# Patient Record
Sex: FEMALE | Race: WHITE | NOT HISPANIC OR LATINO | URBAN - METROPOLITAN AREA
[De-identification: names, ages, dates, MRNs, and addresses within clinical notes are randomized per-mention and may not be internally consistent; named-entity substitution may affect disease eponyms.]

---

## 2018-11-13 ENCOUNTER — IMPORTED ENCOUNTER (OUTPATIENT)
Dept: URBAN - METROPOLITAN AREA CLINIC 59 | Facility: CLINIC | Age: 58
End: 2018-11-13

## 2018-11-13 PROBLEM — H35.372 PUCKERING OF MACULA, LEFT EYE: Noted: 2018-11-13

## 2018-11-13 PROBLEM — H35.363 DRUSEN (DEGENERATIVE) OF MACULA, BILATERAL: Noted: 2018-11-13

## 2018-11-13 PROBLEM — H25.13 BILATERAL NUCLEAR SCLEROSIS CATARACTS: Noted: 2018-11-13

## 2018-11-13 PROCEDURE — 92134 CPTRZ OPH DX IMG PST SGM RTA: CPT

## 2018-11-13 PROCEDURE — 99204 OFFICE O/P NEW MOD 45 MIN: CPT

## 2019-11-12 ENCOUNTER — IMPORTED ENCOUNTER (OUTPATIENT)
Dept: URBAN - METROPOLITAN AREA CLINIC 59 | Facility: CLINIC | Age: 59
End: 2019-11-12

## 2019-11-12 PROBLEM — Z83.518 FAMILY HX OF OTHER SPECIFIED EYE DISORDER: Noted: 2019-11-12

## 2019-11-12 PROBLEM — H35.363 DRUSEN (DEGENERATIVE) OF MACULA, BILATERAL: Noted: 2019-11-12

## 2019-11-12 PROBLEM — H40.013 OPEN ANGLE WITH BORDERLINE FINDINGS, LOW RISK, BILATERAL: Noted: 2019-11-12

## 2019-11-12 PROBLEM — H35.372 PUCKERING OF MACULA, LEFT EYE: Noted: 2019-11-12

## 2019-11-12 PROBLEM — H25.13 BILATERAL NUCLEAR SCLEROSIS CATARACTS: Noted: 2019-11-12

## 2019-11-12 PROCEDURE — 92134 CPTRZ OPH DX IMG PST SGM RTA: CPT

## 2019-11-12 PROCEDURE — 99214 OFFICE O/P EST MOD 30 MIN: CPT

## 2020-01-20 ENCOUNTER — APPOINTMENT (RX ONLY)
Dept: URBAN - METROPOLITAN AREA CLINIC 348 | Facility: CLINIC | Age: 60
Setting detail: DERMATOLOGY
End: 2020-01-20

## 2020-01-20 DIAGNOSIS — L85.3 XEROSIS CUTIS: ICD-10-CM

## 2020-01-20 DIAGNOSIS — B35.3 TINEA PEDIS: ICD-10-CM

## 2020-01-20 PROCEDURE — 99203 OFFICE O/P NEW LOW 30 MIN: CPT

## 2020-01-20 PROCEDURE — ? COUNSELING

## 2020-01-20 PROCEDURE — ? TREATMENT REGIMEN

## 2020-01-20 PROCEDURE — ? PRESCRIPTION

## 2020-01-20 RX ORDER — KETOCONAZOLE 20 MG/G
CREAM TOPICAL
Qty: 1 | Refills: 2 | Status: ERX | COMMUNITY
Start: 2020-01-20

## 2020-01-20 RX ORDER — TRIAMCINOLONE ACETONIDE 1 MG/G
CREAM TOPICAL BID
Qty: 1 | Refills: 1 | Status: ERX | COMMUNITY
Start: 2020-01-20

## 2020-01-20 RX ADMIN — KETOCONAZOLE: 20 CREAM TOPICAL at 00:00

## 2020-01-20 RX ADMIN — TRIAMCINOLONE ACETONIDE: 1 CREAM TOPICAL at 00:00

## 2020-01-20 ASSESSMENT — LOCATION ZONE DERM
LOCATION ZONE: TRUNK
LOCATION ZONE: FEET

## 2020-01-20 ASSESSMENT — LOCATION SIMPLE DESCRIPTION DERM
LOCATION SIMPLE: LEFT PLANTAR SURFACE
LOCATION SIMPLE: LEFT UPPER BACK
LOCATION SIMPLE: RIGHT PLANTAR SURFACE

## 2020-01-20 ASSESSMENT — LOCATION DETAILED DESCRIPTION DERM
LOCATION DETAILED: RIGHT MEDIAL PLANTAR MIDFOOT
LOCATION DETAILED: LEFT SUPERIOR MEDIAL UPPER BACK
LOCATION DETAILED: LEFT MEDIAL PLANTAR MIDFOOT

## 2020-01-20 NOTE — PROCEDURE: MIPS QUALITY
Quality 226: Preventive Care And Screening: Tobacco Use: Screening And Cessation Intervention: Patient screened for tobacco use and is an ex/non-smoker
Detail Level: Detailed
Quality 111:Pneumonia Vaccination Status For Older Adults: Pneumococcal Vaccination Previously Received
Quality 130: Documentation Of Current Medications In The Medical Record: Current Medications Documented
Quality 110: Preventive Care And Screening: Influenza Immunization: Influenza Immunization Administered during Influenza season

## 2020-01-20 NOTE — PROCEDURE: TREATMENT REGIMEN
Initiate Treatment: Ketoconazole cream twice daily x6 weeks
Modify Regimen: Dove unscented soap in shower \\nFragrance free laundry detergent
Plan: Sensitive skin regimen reviewed
Detail Level: Zone
Continue Regimen: Medrol dose gonsalo as directed- from pcp
Otc Regimen: Moisturize daily
Initiate Treatment: Triamcinolone twice daily x2 weeks then as needed for flare

## 2020-10-27 ENCOUNTER — IMPORTED ENCOUNTER (OUTPATIENT)
Dept: URBAN - METROPOLITAN AREA CLINIC 59 | Facility: CLINIC | Age: 60
End: 2020-10-27

## 2020-10-27 PROBLEM — H25.13 BILATERAL NUCLEAR SCLEROSIS CATARACTS: Noted: 2020-10-27

## 2020-10-27 PROBLEM — H35.363 DRUSEN (DEGENERATIVE) OF MACULA, BILATERAL: Noted: 2020-10-27

## 2020-10-27 PROBLEM — Z83.518 FAMILY HX OF OTHER SPECIFIED EYE DISORDER: Noted: 2020-10-27

## 2020-10-27 PROBLEM — H02.34 BLEPHAROCHALASIS OF LT UPPER EYELID: Noted: 2020-10-27

## 2020-10-27 PROBLEM — H35.372 PUCKERING OF MACULA, LEFT EYE: Noted: 2020-10-27

## 2020-10-27 PROBLEM — H40.013 OPEN ANGLE WITH BORDERLINE FINDINGS, LOW RISK, BILATERAL: Noted: 2020-10-27

## 2020-10-27 PROBLEM — H02.31 PSEUDOPTOSIS OF RIGHT UPPER EYELID: Noted: 2020-10-27

## 2020-10-27 PROCEDURE — 76514 ECHO EXAM OF EYE THICKNESS: CPT

## 2020-10-27 PROCEDURE — 92133 CPTRZD OPH DX IMG PST SGM ON: CPT

## 2020-10-27 PROCEDURE — 99214 OFFICE O/P EST MOD 30 MIN: CPT

## 2021-10-26 ENCOUNTER — IMPORTED ENCOUNTER (OUTPATIENT)
Dept: URBAN - METROPOLITAN AREA CLINIC 59 | Facility: CLINIC | Age: 61
End: 2021-10-26

## 2021-10-26 PROBLEM — H35.372 PUCKERING OF MACULA, LEFT EYE: Noted: 2021-10-26

## 2021-10-26 PROBLEM — H02.36 BLEPHAROCHALASIS OF LT UPPER EYELID: Noted: 2021-10-26

## 2021-10-26 PROBLEM — H40.013 OPEN ANGLE WITH BORDERLINE GLAUCOMA FINDINGS, LOW RISK: Noted: 2021-10-26

## 2021-10-26 PROBLEM — H02.31 PSEUDOPTOSIS OF RIGHT UPPER EYELID: Noted: 2021-10-26

## 2021-10-26 PROBLEM — H25.13 BILATERAL NUCLEAR SCLEROSIS CATARACTS: Noted: 2021-10-26

## 2021-10-26 PROBLEM — H40.013 OPEN ANGLE WITH BORDERLINE FINDINGS, LOW RISK, BILATERAL: Noted: 2021-10-26

## 2021-10-26 PROBLEM — Z83.518 FAMILY HX OF OTHER SPECIFIED EYE DISORDER: Noted: 2021-10-26

## 2021-10-26 PROBLEM — H25.13 NUCLEAR SCLEROSIS: Noted: 2021-10-26

## 2021-10-26 PROBLEM — H02.33 BLEPHAROCHALASIS: Noted: 2021-10-26

## 2021-10-26 PROBLEM — H35.372 MACULAR PUCKER: Noted: 2021-10-26

## 2021-10-26 PROBLEM — H02.34 BLEPHAROCHALASIS OF LT UPPER EYELID: Noted: 2021-10-26

## 2021-10-26 PROBLEM — H02.33 PSEUDOPTOSIS OF RIGHT UPPER EYELID: Noted: 2021-10-26

## 2021-10-26 PROBLEM — H02.36 BLEPHAROCHALASIS: Noted: 2021-10-26

## 2021-10-26 PROCEDURE — 99214 OFFICE O/P EST MOD 30 MIN: CPT

## 2021-10-26 PROCEDURE — 92133 CPTRZD OPH DX IMG PST SGM ON: CPT

## 2022-11-04 ENCOUNTER — EMERGENCY VISIT (OUTPATIENT)
Dept: URBAN - METROPOLITAN AREA CLINIC 22 | Facility: CLINIC | Age: 62
End: 2022-11-04

## 2022-11-04 DIAGNOSIS — H02.36: ICD-10-CM

## 2022-11-04 DIAGNOSIS — H02.33: ICD-10-CM

## 2022-11-04 DIAGNOSIS — H40.013: ICD-10-CM

## 2022-11-04 DIAGNOSIS — H11.32: ICD-10-CM

## 2022-11-04 PROCEDURE — 92012 INTRM OPH EXAM EST PATIENT: CPT

## 2022-11-04 ASSESSMENT — TONOMETRY
OS_IOP_MMHG: 15
OD_IOP_MMHG: 14

## 2022-11-04 ASSESSMENT — VISUAL ACUITY
OD_CC: 20/20
OS_PH: 20/20
OS_CC: 20/30-2

## 2022-12-02 ENCOUNTER — ESTABLISHED COMPREHENSIVE EXAM (OUTPATIENT)
Dept: URBAN - METROPOLITAN AREA CLINIC 22 | Facility: CLINIC | Age: 62
End: 2022-12-02

## 2022-12-02 DIAGNOSIS — H25.13: ICD-10-CM

## 2022-12-02 DIAGNOSIS — H40.013: ICD-10-CM

## 2022-12-02 DIAGNOSIS — Z83.518: ICD-10-CM

## 2022-12-02 DIAGNOSIS — H02.33: ICD-10-CM

## 2022-12-02 DIAGNOSIS — H02.36: ICD-10-CM

## 2022-12-02 DIAGNOSIS — H35.372: ICD-10-CM

## 2022-12-02 PROCEDURE — 92014 COMPRE OPH EXAM EST PT 1/>: CPT

## 2022-12-02 PROCEDURE — 92133 CPTRZD OPH DX IMG PST SGM ON: CPT

## 2022-12-02 ASSESSMENT — TONOMETRY
OS_IOP_MMHG: 14
OD_IOP_MMHG: 14

## 2022-12-02 ASSESSMENT — VISUAL ACUITY
OD_CC: 20/20
OS_CC: 20/25-1

## 2023-10-14 ASSESSMENT — TONOMETRY
OS_IOP_MMHG: 19
OS_IOP_MMHG: 18
OS_IOP_MMHG: 16
OD_IOP_MMHG: 17
OD_IOP_MMHG: 18
OD_IOP_MMHG: 19

## 2023-10-14 ASSESSMENT — VISUAL ACUITY
OD_SC: 20/40-1
OS_SC: 20/40
OS_CC: 20/20
OD_CC: 20/20
OS_CC: 20/25+2
OS_CC: 20/25+1
OD_CC: 20/20
OD_CC: 20/20
OD_CC: 20/30+2
OS_CC: 20/25+1

## 2023-11-21 ENCOUNTER — ESTABLISHED COMPREHENSIVE EXAM (OUTPATIENT)
Dept: URBAN - METROPOLITAN AREA CLINIC 22 | Facility: CLINIC | Age: 63
End: 2023-11-21

## 2023-11-21 DIAGNOSIS — H02.36: ICD-10-CM

## 2023-11-21 DIAGNOSIS — H02.33: ICD-10-CM

## 2023-11-21 DIAGNOSIS — H40.013: ICD-10-CM

## 2023-11-21 DIAGNOSIS — H25.13: ICD-10-CM

## 2023-11-21 DIAGNOSIS — H35.372: ICD-10-CM

## 2023-11-21 PROCEDURE — 92014 COMPRE OPH EXAM EST PT 1/>: CPT

## 2023-11-21 PROCEDURE — 92133 CPTRZD OPH DX IMG PST SGM ON: CPT

## 2023-11-21 ASSESSMENT — VISUAL ACUITY
OD_CC: 20/20
OS_CC: 20/20

## 2023-11-21 ASSESSMENT — TONOMETRY
OD_IOP_MMHG: 17
OS_IOP_MMHG: 18

## 2024-06-25 ENCOUNTER — ESTABLISHED COMPREHENSIVE EXAM (OUTPATIENT)
Dept: URBAN - METROPOLITAN AREA CLINIC 22 | Facility: CLINIC | Age: 64
End: 2024-06-25

## 2024-06-25 DIAGNOSIS — H40.013: ICD-10-CM

## 2024-06-25 DIAGNOSIS — H02.33: ICD-10-CM

## 2024-06-25 DIAGNOSIS — H35.372: ICD-10-CM

## 2024-06-25 DIAGNOSIS — H02.36: ICD-10-CM

## 2024-06-25 DIAGNOSIS — H25.13: ICD-10-CM

## 2024-06-25 PROCEDURE — 92133 CPTRZD OPH DX IMG PST SGM ON: CPT

## 2024-06-25 PROCEDURE — 92083 EXTENDED VISUAL FIELD XM: CPT

## 2024-06-25 PROCEDURE — 92014 COMPRE OPH EXAM EST PT 1/>: CPT

## 2024-06-25 ASSESSMENT — VISUAL ACUITY
OD_CC: 20/20-1
OS_CC: 20/20

## 2024-06-25 ASSESSMENT — TONOMETRY
OS_IOP_MMHG: 18
OD_IOP_MMHG: 18

## 2024-08-06 ENCOUNTER — APPOINTMENT (RX ONLY)
Dept: URBAN - METROPOLITAN AREA CLINIC 341 | Facility: CLINIC | Age: 64
Setting detail: DERMATOLOGY
End: 2024-08-06

## 2024-08-06 DIAGNOSIS — D18.0 HEMANGIOMA: ICD-10-CM | Status: STABLE

## 2024-08-06 DIAGNOSIS — L30.9 DERMATITIS, UNSPECIFIED: ICD-10-CM | Status: INADEQUATELY CONTROLLED

## 2024-08-06 PROBLEM — D18.01 HEMANGIOMA OF SKIN AND SUBCUTANEOUS TISSUE: Status: ACTIVE | Noted: 2024-08-06

## 2024-08-06 PROCEDURE — ? COUNSELING

## 2024-08-06 PROCEDURE — 99204 OFFICE O/P NEW MOD 45 MIN: CPT

## 2024-08-06 PROCEDURE — ? PRESCRIPTION

## 2024-08-06 PROCEDURE — ? PRESCRIPTION MEDICATION MANAGEMENT

## 2024-08-06 RX ORDER — CLOBETASOL PROPIONATE 0.5 MG/G
OINTMENT TOPICAL ONCE DAILY
Qty: 60 | Refills: 2 | Status: ERX | COMMUNITY
Start: 2024-08-06

## 2024-08-06 RX ADMIN — CLOBETASOL PROPIONATE: 0.5 OINTMENT TOPICAL at 00:00

## 2024-08-06 ASSESSMENT — LOCATION DETAILED DESCRIPTION DERM
LOCATION DETAILED: RIGHT MID DORSAL RING FINGER
LOCATION DETAILED: EPIGASTRIC SKIN
LOCATION DETAILED: RIGHT MID DORSAL INDEX FINGER
LOCATION DETAILED: RIGHT DISTAL ULNAR THUMB

## 2024-08-06 ASSESSMENT — LOCATION SIMPLE DESCRIPTION DERM
LOCATION SIMPLE: RIGHT THUMB
LOCATION SIMPLE: ABDOMEN
LOCATION SIMPLE: RIGHT INDEX FINGER
LOCATION SIMPLE: RIGHT RING FINGER

## 2024-08-06 ASSESSMENT — LOCATION ZONE DERM
LOCATION ZONE: TRUNK
LOCATION ZONE: FINGER

## 2024-08-06 NOTE — PROCEDURE: PRESCRIPTION MEDICATION MANAGEMENT
Detail Level: Zone
Render In Strict Bullet Format?: No
Initiate Treatment: clobetasol 0.05 % topical ointment: Apply to the affected areas on the fingers twice daily x 2 weeks then as needed for flares. Do NOT use on the face

## 2024-10-28 ENCOUNTER — APPOINTMENT (RX ONLY)
Dept: URBAN - METROPOLITAN AREA CLINIC 341 | Facility: CLINIC | Age: 64
Setting detail: DERMATOLOGY
End: 2024-10-28

## 2024-10-28 DIAGNOSIS — D18.0 HEMANGIOMA: ICD-10-CM

## 2024-10-28 DIAGNOSIS — L30.4 ERYTHEMA INTERTRIGO: ICD-10-CM | Status: INADEQUATELY CONTROLLED

## 2024-10-28 DIAGNOSIS — L82.1 OTHER SEBORRHEIC KERATOSIS: ICD-10-CM

## 2024-10-28 DIAGNOSIS — L30.9 DERMATITIS, UNSPECIFIED: ICD-10-CM | Status: INADEQUATELY CONTROLLED

## 2024-10-28 DIAGNOSIS — L85.2 KERATOSIS PUNCTATA (PALMARIS ET PLANTARIS): ICD-10-CM | Status: INADEQUATELY CONTROLLED

## 2024-10-28 DIAGNOSIS — D22 MELANOCYTIC NEVI: ICD-10-CM

## 2024-10-28 DIAGNOSIS — L81.4 OTHER MELANIN HYPERPIGMENTATION: ICD-10-CM

## 2024-10-28 PROBLEM — D22.62 MELANOCYTIC NEVI OF LEFT UPPER LIMB, INCLUDING SHOULDER: Status: ACTIVE | Noted: 2024-10-28

## 2024-10-28 PROBLEM — D18.01 HEMANGIOMA OF SKIN AND SUBCUTANEOUS TISSUE: Status: ACTIVE | Noted: 2024-10-28

## 2024-10-28 PROCEDURE — ? OTC TREATMENT REGIMEN

## 2024-10-28 PROCEDURE — ? TREATMENT REGIMEN

## 2024-10-28 PROCEDURE — ? OTC SALICYLIC ACID COUNSELING

## 2024-10-28 PROCEDURE — ? PRESCRIPTION MEDICATION MANAGEMENT

## 2024-10-28 PROCEDURE — ? COUNSELING

## 2024-10-28 PROCEDURE — ? FULL BODY SKIN EXAM

## 2024-10-28 PROCEDURE — 99214 OFFICE O/P EST MOD 30 MIN: CPT

## 2024-10-28 ASSESSMENT — LOCATION ZONE DERM
LOCATION ZONE: FINGER
LOCATION ZONE: HAND
LOCATION ZONE: TRUNK
LOCATION ZONE: FEET

## 2024-10-28 ASSESSMENT — LOCATION SIMPLE DESCRIPTION DERM
LOCATION SIMPLE: RIGHT THUMB
LOCATION SIMPLE: RIGHT INDEX FINGER
LOCATION SIMPLE: LEFT HAND
LOCATION SIMPLE: LEFT PLANTAR SURFACE
LOCATION SIMPLE: ABDOMEN
LOCATION SIMPLE: RIGHT RING FINGER

## 2024-10-28 ASSESSMENT — LOCATION DETAILED DESCRIPTION DERM
LOCATION DETAILED: 3RD WEB SPACE LEFT HAND
LOCATION DETAILED: LEFT RADIAL DORSAL HAND
LOCATION DETAILED: LEFT PLANTAR FOREFOOT OVERLYING 2ND METATARSAL
LOCATION DETAILED: RIGHT MID DORSAL RING FINGER
LOCATION DETAILED: LEFT ULNAR DORSAL HAND
LOCATION DETAILED: RIGHT RIB CAGE
LOCATION DETAILED: RIGHT DISTAL ULNAR THUMB
LOCATION DETAILED: RIGHT MID DORSAL INDEX FINGER
LOCATION DETAILED: LEFT RIB CAGE

## 2024-10-28 NOTE — PROCEDURE: OTC TREATMENT REGIMEN
Detail Level: Zone
Patient Specific Otc Recommendations (Will Not Stick From Patient To Patient): Talc free fragrance free powder as needed for maintenance

## 2024-10-28 NOTE — PROCEDURE: PRESCRIPTION MEDICATION MANAGEMENT
Detail Level: Zone
Render In Strict Bullet Format?: No
Continue Regimen: clobetasol 0.05 % topical ointment: Apply to the affected areas on the fingers twice daily x 2 weeks then as needed for flares. Do NOT use on the face